# Patient Record
Sex: MALE | Race: OTHER | HISPANIC OR LATINO | URBAN - METROPOLITAN AREA
[De-identification: names, ages, dates, MRNs, and addresses within clinical notes are randomized per-mention and may not be internally consistent; named-entity substitution may affect disease eponyms.]

---

## 2021-10-29 ENCOUNTER — APPOINTMENT (RX ONLY)
Dept: URBAN - METROPOLITAN AREA OTHER 8 | Facility: OTHER | Age: 46
Setting detail: DERMATOLOGY
End: 2021-10-29

## 2021-10-29 DIAGNOSIS — Z12.83 ENCOUNTER FOR SCREENING FOR MALIGNANT NEOPLASM OF SKIN: ICD-10-CM

## 2021-10-29 DIAGNOSIS — Z71.89 OTHER SPECIFIED COUNSELING: ICD-10-CM

## 2021-10-29 DIAGNOSIS — L70.8 OTHER ACNE: ICD-10-CM

## 2021-10-29 DIAGNOSIS — L30.8 OTHER SPECIFIED DERMATITIS: ICD-10-CM

## 2021-10-29 DIAGNOSIS — D18.0 HEMANGIOMA: ICD-10-CM

## 2021-10-29 DIAGNOSIS — L98.8 OTHER SPECIFIED DISORDERS OF THE SKIN AND SUBCUTANEOUS TISSUE: ICD-10-CM

## 2021-10-29 DIAGNOSIS — D22 MELANOCYTIC NEVI: ICD-10-CM

## 2021-10-29 PROBLEM — D22.72 MELANOCYTIC NEVI OF LEFT LOWER LIMB, INCLUDING HIP: Status: ACTIVE | Noted: 2021-10-29

## 2021-10-29 PROBLEM — D18.01 HEMANGIOMA OF SKIN AND SUBCUTANEOUS TISSUE: Status: ACTIVE | Noted: 2021-10-29

## 2021-10-29 PROBLEM — D22.71 MELANOCYTIC NEVI OF RIGHT LOWER LIMB, INCLUDING HIP: Status: ACTIVE | Noted: 2021-10-29

## 2021-10-29 PROBLEM — D22.5 MELANOCYTIC NEVI OF TRUNK: Status: ACTIVE | Noted: 2021-10-29

## 2021-10-29 PROCEDURE — ? TREATMENT REGIMEN

## 2021-10-29 PROCEDURE — 99204 OFFICE O/P NEW MOD 45 MIN: CPT

## 2021-10-29 PROCEDURE — ? PRESCRIPTION MEDICATION MANAGEMENT

## 2021-10-29 PROCEDURE — ? PRESCRIPTION

## 2021-10-29 PROCEDURE — ? COUNSELING

## 2021-10-29 RX ORDER — CLOBETASOL PROPIONATE 0.25 MG/G
CREAM TOPICAL BID
Qty: 60 | Refills: 0 | Status: ERX | COMMUNITY
Start: 2021-10-29

## 2021-10-29 RX ADMIN — CLOBETASOL PROPIONATE: 0.25 CREAM TOPICAL at 00:00

## 2021-10-29 ASSESSMENT — LOCATION DETAILED DESCRIPTION DERM
LOCATION DETAILED: LEFT ANTERIOR PROXIMAL THIGH
LOCATION DETAILED: RIGHT SUPERIOR MEDIAL MIDBACK
LOCATION DETAILED: RIGHT RADIAL PALM
LOCATION DETAILED: LEFT MEDIAL INFERIOR CHEST
LOCATION DETAILED: LEFT RADIAL PALM
LOCATION DETAILED: RIGHT DISTAL POSTERIOR THIGH
LOCATION DETAILED: RIGHT MEDIAL PROXIMAL PRETIBIAL REGION
LOCATION DETAILED: LEFT SUPERIOR MEDIAL UPPER BACK
LOCATION DETAILED: LEFT PROXIMAL PALMAR RING FINGER
LOCATION DETAILED: RIGHT POPLITEAL SKIN
LOCATION DETAILED: RIGHT DORSAL FOOT
LOCATION DETAILED: LEFT LATERAL ABDOMEN

## 2021-10-29 ASSESSMENT — LOCATION SIMPLE DESCRIPTION DERM
LOCATION SIMPLE: ABDOMEN
LOCATION SIMPLE: LEFT HAND
LOCATION SIMPLE: RIGHT HAND
LOCATION SIMPLE: LEFT THIGH
LOCATION SIMPLE: RIGHT PRETIBIAL REGION
LOCATION SIMPLE: CHEST
LOCATION SIMPLE: LEFT RING FINGER
LOCATION SIMPLE: LEFT UPPER BACK
LOCATION SIMPLE: RIGHT LOWER BACK
LOCATION SIMPLE: RIGHT POPLITEAL SKIN
LOCATION SIMPLE: RIGHT POSTERIOR THIGH
LOCATION SIMPLE: RIGHT FOOT

## 2021-10-29 ASSESSMENT — LOCATION ZONE DERM
LOCATION ZONE: LEG
LOCATION ZONE: FINGER
LOCATION ZONE: TRUNK
LOCATION ZONE: HAND
LOCATION ZONE: FEET

## 2021-10-29 NOTE — PROCEDURE: PRESCRIPTION MEDICATION MANAGEMENT
Render In Strict Bullet Format?: No
Initiate Treatment: Impoyz 0.025 % topical cream Apply to affected areas of hands BID x up to 2 weeks. Stop x 2 weeks before repeating as needed. Avoid face and body folds
Plan: Recheck in 2 months
Modify Regimen: Aveeno cracked skin relief balm after each water exposure, 10x a day\\nSwitch to Dove sensitive skin soap
Continue Regimen: Sleeping in cotton gloves
Detail Level: Detailed

## 2024-06-07 ENCOUNTER — LAB OUTSIDE AN ENCOUNTER (OUTPATIENT)
Dept: URBAN - METROPOLITAN AREA SURGERY CENTER 8 | Facility: SURGERY CENTER | Age: 49
End: 2024-06-07

## 2024-06-11 ENCOUNTER — OFFICE VISIT (OUTPATIENT)
Dept: URBAN - METROPOLITAN AREA SURGERY CENTER 8 | Facility: SURGERY CENTER | Age: 49
End: 2024-06-11

## 2024-06-17 ENCOUNTER — CLAIMS CREATED FROM THE CLAIM WINDOW (OUTPATIENT)
Dept: URBAN - METROPOLITAN AREA SURGERY CENTER 8 | Facility: SURGERY CENTER | Age: 49
End: 2024-06-17
Payer: COMMERCIAL

## 2024-06-17 ENCOUNTER — CLAIMS CREATED FROM THE CLAIM WINDOW (OUTPATIENT)
Dept: URBAN - METROPOLITAN AREA CLINIC 4 | Facility: CLINIC | Age: 49
End: 2024-06-17
Payer: COMMERCIAL

## 2024-06-17 DIAGNOSIS — D17.5 BENIGN LIPOMATOUS NEOPLASM OF INTRA-ABDOMINAL ORGANS: ICD-10-CM

## 2024-06-17 DIAGNOSIS — D12.4 ADENOMA OF DESCENDING COLON: ICD-10-CM

## 2024-06-17 DIAGNOSIS — D17.9 BENIGN LIPOMATOUS NEOPLASM, UNSPECIFIED: ICD-10-CM

## 2024-06-17 DIAGNOSIS — K64.8 OTHER HEMORRHOIDS: ICD-10-CM

## 2024-06-17 DIAGNOSIS — D12.4 BENIGN NEOPLASM OF DESCENDING COLON: ICD-10-CM

## 2024-06-17 DIAGNOSIS — Z12.11 COLON CANCER SCREENING: ICD-10-CM

## 2024-06-17 DIAGNOSIS — D12.3 BENIGN NEOPLASM OF TRANSVERSE COLON: ICD-10-CM

## 2024-06-17 DIAGNOSIS — D12.3 ADENOMA OF TRANSVERSE COLON: ICD-10-CM

## 2024-06-17 DIAGNOSIS — K63.5 BENIGN COLON POLYPS: ICD-10-CM

## 2024-06-17 DIAGNOSIS — K57.30 DIVERTCULOSIS OF LG INT W/O PERFORATION OR ABSCESS W/O BLEEDING: ICD-10-CM

## 2024-06-17 PROCEDURE — 45385 COLONOSCOPY W/LESION REMOVAL: CPT | Performed by: STUDENT IN AN ORGANIZED HEALTH CARE EDUCATION/TRAINING PROGRAM

## 2024-06-17 PROCEDURE — 88305 TISSUE EXAM BY PATHOLOGIST: CPT | Performed by: PATHOLOGY

## 2024-06-17 PROCEDURE — 00812 ANES LWR INTST SCR COLSC: CPT
